# Patient Record
(demographics unavailable — no encounter records)

---

## 2024-11-14 NOTE — DISCUSSION/SUMMARY
[de-identified] : A discussion regarding available pain management treatment options occurred with the patient. These included interventional, rehabilitative, pharmacological, and alternative modalities. We will proceed with the following:   Interventional treatment options: -Deferred at this time.  Rehabilitative options: -Participation in active HEP was discussed and printed. I gave the patient a list of home exercises to do for pain reduction and overall improvement in functional status including but not limited to active neck rotation, active neck side bend, neck flexion, neck extension, chin tuck, scalene stretch, isometric neck flexion, isometric neck extension, isometric neck side bend, head lift/neck curl, head lift/neck side bend, neck extension on hands and knees, and scapula squeeze.   Medication based treatment options: -Discontinue meloxicam 15mg daily for 4 weeks. Discussed risks and benefits. Avoid taking for any side effects. -Patient is aware to take for 2 weeks straight than take 1 week off before repeating.   Complementary treatment options: - Patient was advised to stay away from any heavy lifting. If needed, she was advised to squat and not bend forward. - Physician directed activity and lifestyle modifications.  Follow up as needed.  I, Lorena Rendon, attest that this documentation has been prepared under the direction and in the presence of Provider Silvio Allen, DO The documentation recorded by the scribe, in my presence, accurately reflects the service I personally performed, and the decisions made by me with my edits as appropriate.  Best Regards, Silvio Allen D.O.

## 2024-11-14 NOTE — PHYSICAL EXAM
[de-identified] : Cervical Spine Exam: Inspection: erythema (-)   ecchymosis (-)                   Cervical paraspinal mm tenderness: R (-); L(-)   Upper trapezius mm tenderness:  R (+); L (-)     ROM:    Full ROM      Special Testing: Spurling Test: R (+); L (-)   Facet load test: R (-); L (-)

## 2024-11-14 NOTE — HISTORY OF PRESENT ILLNESS
[FreeTextEntry1] : HISTORY OF PRESENT ILLNESS: Mrs. Driver is an 80-year-old female complaining of neck pain with radiculopathy into her right shoulder down to her right arm. She also notes headaches. The pain started after no specific injury or event.  The patient has had this pain for months. Patient describes the pain as moderate to severe.  During the last month the pain has been nearly constant with symptoms worsening in no typical pattern. Pain described as sharp, burning, tingling that radiates down the right shoulder and arm and is 8/10 pain intensity. Cervical extension and lateral rotation improve the pain, cervical flexion worsens the pain. NSAIDs has been tried without relief. Patient denies bowel/bladder incontinence, weakness, falls, tingling, numbness.   PRESENTING TODAY 11-: Patient presents to the office today for a follow up visit, status post a cervical paraspinal trigger point injection since last visit which provided her with 70% sustained relief to date. She does continue with intermittent radiculopathy into her left arm associated with tingling in her left hand which may be post-surgical nerve damage. Pain is a 5/10 on the pain scale. Patient denies any bowel or bladder dysfunction, incontinence, or saddle anesthesia.

## 2025-04-14 NOTE — HISTORY OF PRESENT ILLNESS
[FreeTextEntry1] : HISTORY OF PRESENT ILLNESS: Mrs. Driver is an 80-year-old female complaining of neck pain with radiculopathy into her right shoulder down to her right arm. She also notes headaches. The pain started after no specific injury or event.  The patient has had this pain for months. Patient describes the pain as moderate to severe.  During the last month the pain has been nearly constant with symptoms worsening in no typical pattern. Pain described as sharp, burning, tingling that radiates down the right shoulder and arm and is 8/10 pain intensity. Cervical extension and lateral rotation improve the pain, cervical flexion worsens the pain. NSAIDs has been tried without relief. Patient denies bowel/bladder incontinence, weakness, falls, tingling, numbness.   PRESENTING TODAY 11-: Patient presents to the office today for a follow up visit, status post a cervical paraspinal trigger point injection since last visit which provided her with 70% sustained relief to date. She does continue with intermittent radiculopathy into her left arm associated with tingling in her left hand which may be post-surgical nerve damage. Pain is a 5/10 on the pain scale. Patient denies any bowel or bladder dysfunction, incontinence, or saddle anesthesia.  TODAY (4/14/25)  Pt is an 81 year old female who is here today for a follow up, she is under our care for neck discomfort with her main concern being RT arm pain, she denies any numbness or tingling, she previously underwent a  cervical paraspinal trigger point injection since last visit which provided her with 70% sustained relief, until 6 weeks ago she states that her pain started to go back to baseline, she is here requesting for another TPI

## 2025-04-14 NOTE — DISCUSSION/SUMMARY
[de-identified] : A discussion regarding available pain management treatment options occurred with the patient. These included interventional, rehabilitative, pharmacological, and alternative modalities. We will proceed with the following:   Interventional treatment options: 1. trap RT side TPI --> if she continues to have pain, we will order cervical MRI on her next visit.  -Patient has documented myofascial spasms and trigger points in the muscle, TPI was performed today in the office. Risk and benefit were discussed with the patient today and they agreed to move forward with the plan.  Risks with the procedure such as bleeding and infection was discussed in detail.  Rehabilitative options: -Participation in active HEP was discussed and printed. I gave the patient a list of home exercises to do for pain reduction and overall improvement in functional status including but not limited to active neck rotation, active neck side bend, neck flexion, neck extension, chin tuck, scalene stretch, isometric neck flexion, isometric neck extension, isometric neck side bend, head lift/neck curl, head lift/neck side bend, neck extension on hands and knees, and scapula squeeze.     Complementary treatment options: - Patient was advised to stay away from any heavy lifting. If needed, she was advised to squat and not bend forward. - Physician directed activity and lifestyle modifications.  Follow up as needed.  TETE Leslie DO

## 2025-04-14 NOTE — PHYSICAL EXAM
[de-identified] : Cervical Spine Exam: Inspection: erythema (-)   ecchymosis (-)                   Cervical paraspinal mm tenderness: R (-); L(-)   Upper trapezius mm tenderness:  R (+); L (-)     ROM:    Full ROM      Special Testing: Spurling Test: R (+); L (-)

## 2025-04-14 NOTE — PROCEDURE
[Trigger point 1-2 muscle groups] : trigger point 1-2 muscle groups [Right] : of the right [Trapezius muscle] : trapezius muscle [Pain] : pain [Alcohol] : alcohol [___ cc    0.5%] : Bupivacaine (Marcaine) ~Vcc of 0.5%  [___ cc    10mg] : Triamcinolone (Kenalog) ~Vcc of 10 mg  [Previous OTC use and PT nontherapeutic] : patient has tried OTC's including aspirin, Ibuprofen, Aleve, etc or prescription NSAIDS, and/or exercises at home and/or physical therapy without satisfactory response [Risks, benefits, alternatives discussed / Verbal consent obtained] : the risks benefits, and alternatives have been discussed, and verbal consent was obtained [___ cc    4mg] : Dexamethasone (Decadron) ~Vcc of 4 mg